# Patient Record
Sex: FEMALE | Race: WHITE | NOT HISPANIC OR LATINO
[De-identification: names, ages, dates, MRNs, and addresses within clinical notes are randomized per-mention and may not be internally consistent; named-entity substitution may affect disease eponyms.]

---

## 2021-04-29 PROBLEM — Z00.00 ENCOUNTER FOR PREVENTIVE HEALTH EXAMINATION: Status: ACTIVE | Noted: 2021-04-29

## 2021-05-05 ENCOUNTER — APPOINTMENT (OUTPATIENT)
Dept: INFECTIOUS DISEASE | Facility: CLINIC | Age: 86
End: 2021-05-05
Payer: MEDICARE

## 2021-05-05 VITALS
RESPIRATION RATE: 15 BRPM | HEART RATE: 87 BPM | SYSTOLIC BLOOD PRESSURE: 114 MMHG | WEIGHT: 117 LBS | TEMPERATURE: 97.9 F | DIASTOLIC BLOOD PRESSURE: 68 MMHG | OXYGEN SATURATION: 100 %

## 2021-05-05 DIAGNOSIS — N17.9 ACUTE KIDNEY FAILURE, UNSPECIFIED: ICD-10-CM

## 2021-05-05 DIAGNOSIS — Z86.03 PERSONAL HISTORY OF NEOPLASM OF UNCERTAIN BEHAVIOR: ICD-10-CM

## 2021-05-05 DIAGNOSIS — Z87.01 PERSONAL HISTORY OF PNEUMONIA (RECURRENT): ICD-10-CM

## 2021-05-05 DIAGNOSIS — Z87.19 PERSONAL HISTORY OF OTHER DISEASES OF THE DIGESTIVE SYSTEM: ICD-10-CM

## 2021-05-05 DIAGNOSIS — Z87.440 PERSONAL HISTORY OF URINARY (TRACT) INFECTIONS: ICD-10-CM

## 2021-05-05 DIAGNOSIS — N28.1 CYST OF KIDNEY, ACQUIRED: ICD-10-CM

## 2021-05-05 DIAGNOSIS — Z85.3 PERSONAL HISTORY OF MALIGNANT NEOPLASM OF BREAST: ICD-10-CM

## 2021-05-05 DIAGNOSIS — J44.1 CHRONIC OBSTRUCTIVE PULMONARY DISEASE WITH (ACUTE) EXACERBATION: ICD-10-CM

## 2021-05-05 DIAGNOSIS — Z86.39 PERSONAL HISTORY OF OTHER ENDOCRINE, NUTRITIONAL AND METABOLIC DISEASE: ICD-10-CM

## 2021-05-05 DIAGNOSIS — Z92.3 PERSONAL HISTORY OF IRRADIATION: ICD-10-CM

## 2021-05-05 DIAGNOSIS — Z86.79 PERSONAL HISTORY OF OTHER DISEASES OF THE CIRCULATORY SYSTEM: ICD-10-CM

## 2021-05-05 PROCEDURE — 99214 OFFICE O/P EST MOD 30 MIN: CPT

## 2021-05-05 RX ORDER — IPRATROPIUM BROMIDE AND ALBUTEROL SULFATE 2.5; .5 MG/3ML; MG/3ML
SOLUTION RESPIRATORY (INHALATION) 4 TIMES DAILY
Refills: 0 | Status: ACTIVE | COMMUNITY

## 2021-05-05 RX ORDER — FUROSEMIDE 20 MG/1
20 TABLET ORAL
Refills: 0 | Status: ACTIVE | COMMUNITY

## 2021-05-05 RX ORDER — PRAVASTATIN SODIUM 40 MG/1
40 TABLET ORAL
Refills: 0 | Status: ACTIVE | COMMUNITY

## 2021-05-05 RX ORDER — PANTOPRAZOLE SODIUM 40 MG/1
40 TABLET, DELAYED RELEASE ORAL
Refills: 0 | Status: ACTIVE | COMMUNITY

## 2021-05-05 RX ORDER — LEVOFLOXACIN 250 MG/1
250 TABLET, FILM COATED ORAL
Refills: 0 | Status: COMPLETED | COMMUNITY

## 2021-05-05 NOTE — HISTORY OF PRESENT ILLNESS
[FreeTextEntry1] : 88 y/o woman with COPD, h/o left breast cancer s/p lumpectomy and XRT to left chest, admitted with SOB and left sided pneumonia- s/p completing 10 days levaquin on 5/2 \par Continues to have SOB with exertion \par Pulse Ox shows good O2 levels - has a visiting nurse at home\par c/o mild cough but no sputum \par no fever or chills\par remains on prednisone taper at 20mg daily \par has an appointment with pulmonary on 5/17 \par c/o constipation \par Hospital blood work showed a highly elevated Mycoplasma IGM -- likely an atypical pneumonia with mycoplasma \par

## 2021-05-05 NOTE — ASSESSMENT
[Treatment Education] : treatment education [Treatment Adherence] : treatment adherence [Drug Interactions / Side Effects] : drug interactions/side effects [FreeTextEntry1] : 86 y/o woman with COPD, h/o left breast cancer s/p lumpectomy and XRT to left chest, admitted with SOB and left sided pneumonia- s/p completing 10 days levaquin on 5/2 \par Continues to have SOB with exertion \par Pulse Ox shows good O2 levels - has a visiting nurse at home\par c/o mild cough but no sputum \par no fever or chills\par remains on prednisone taper at 20mg daily \par has an appointment with pulmonary on 5/17 \par c/o constipation \par Had a low CD4 count in the hospital \par Hospital blood work showed a highly elevated Mycoplasma IGM -- likely an atypical pneumonia with mycoplasma \par \par Rec:\par \par 1. Levaquin 250mg po daily x 5 days to complete 2 weeks therapy \par 2. Repeat CXR \par 3. Taper down prednisone to 10mg daily until pulmonary follow up on 5/17 \par 4. Probiotics while on Abxs \par 5. Miralax for constipation \par 6. Repeat T cell CD4 count next week \par 7. Pulmonary follow up \par 8. Follow up next week \par \par plan above d/w patient and daughter at length, questions answered \par

## 2021-05-05 NOTE — PHYSICAL EXAM
[General Appearance - Alert] : alert [General Appearance - In No Acute Distress] : in no acute distress [General Appearance - Well Nourished] : well nourished [Sclera] : the sclera and conjunctiva were normal [Extraocular Movements] : extraocular movements were intact [Outer Ear] : the ears and nose were normal in appearance [Examination Of The Oral Cavity] : the lips and gums were normal [Neck Appearance] : the appearance of the neck was normal [Neck Cervical Mass (___cm)] : no neck mass was observed [Respiration, Rhythm And Depth] : normal respiratory rhythm and effort [Auscultation Breath Sounds / Voice Sounds] : lungs were clear to auscultation bilaterally [Exaggerated Use Of Accessory Muscles For Inspiration] : no accessory muscle use [Heart Rate And Rhythm] : heart rate was normal and rhythm regular [Heart Sounds] : normal S1 and S2 [Edema] : there was no peripheral edema [Bowel Sounds] : normal bowel sounds [Abdomen Soft] : soft [Abdomen Tenderness] : non-tender [Musculoskeletal - Swelling] : no joint swelling [Range of Motion to Joints] : range of motion to joints [] : no rash [Motor Exam] : the motor exam was normal [No Focal Deficits] : no focal deficits [FreeTextEntry1] : ++ ecchymosis UEs

## 2021-05-05 NOTE — REVIEW OF SYSTEMS
[Chills] : chills [Feeling Tired] : feeling tired [Shortness Of Breath] : shortness of breath [Cough] : cough [SOB on Exertion] : shortness of breath during exertion [Constipation] : constipation [Negative] : Heme/Lymph [Fever] : no fever [Body Aches] : no body aches [Feeling Sick] : not feeling sick [Wheezing] : no wheezing [Sputum] : not coughing up ~M sputum [Pleuritic Chest Pain] : no pleuritic chest pain [Abdominal Pain] : no abdominal pain [Vomiting] : no vomiting

## 2021-05-05 NOTE — REASON FOR VISIT
[Post Hospitalization] : a post hospitalization visit [FreeTextEntry1] : fu from hospital stay for LLL Pneumonia\par Levaquin complete \par still c/o trouble breadthing and pain near left ribcage

## 2021-05-12 ENCOUNTER — APPOINTMENT (OUTPATIENT)
Dept: INFECTIOUS DISEASE | Facility: CLINIC | Age: 86
End: 2021-05-12
Payer: MEDICARE

## 2021-05-12 VITALS
RESPIRATION RATE: 15 BRPM | TEMPERATURE: 98.3 F | OXYGEN SATURATION: 100 % | DIASTOLIC BLOOD PRESSURE: 68 MMHG | SYSTOLIC BLOOD PRESSURE: 110 MMHG | HEART RATE: 70 BPM

## 2021-05-12 DIAGNOSIS — J15.7 PNEUMONIA DUE TO MYCOPLASMA PNEUMONIAE: ICD-10-CM

## 2021-05-12 PROCEDURE — 99214 OFFICE O/P EST MOD 30 MIN: CPT

## 2021-05-12 RX ORDER — LEVOFLOXACIN 250 MG/1
250 TABLET, FILM COATED ORAL DAILY
Qty: 5 | Refills: 0 | Status: DISCONTINUED | COMMUNITY
Start: 2021-05-05 | End: 2021-05-12

## 2021-05-12 NOTE — HISTORY OF PRESENT ILLNESS
[FreeTextEntry1] : 88 y/o woman with COPD, h/o left breast cancer s/p lumpectomy and XRT to left chest, admitted with SOB and left sided pneumonia- s/p completing 10 days levaquin on 5/2 \par Continues to have SOB with exertion at times \par Pulse Ox shows good O2 levels - has a visiting nurse at home\par \par \par no fever or chills\par remains on prednisone taper at 10mg daily \par has an appointment with pulmonary on 5/17 \par \par feels generally better but fatigued at times \par no cough or sputum \par \par Repeat CXR done -- resolution of pneumonia \par Repeat CD4 count 149 \par \par patient on chronic steroids \par \par per daughter had some episodes of confusion with steroids

## 2021-05-12 NOTE — REASON FOR VISIT
[Follow-Up: _____] : a [unfilled] follow-up visit [Family Member] : family member [FreeTextEntry1] : 1 wk fu \par levaquin completed\par pt feels well, only c/o confusion, started when taking prednisone \par CXR/ bw results

## 2021-05-12 NOTE — ASSESSMENT
[Treatment Education] : treatment education [Treatment Adherence] : treatment adherence [Rx Dose / Side Effects] : Rx dose/side effects [Drug Interactions / Side Effects] : drug interactions/side effects [FreeTextEntry1] : 88 y/o woman with COPD, h/o left breast cancer s/p lumpectomy and XRT to left chest, admitted with SOB and left sided pneumonia- s/p completing 14 days levaquin \par Pulse Ox shows good O2 levels - has a visiting nurse at home\par remains on prednisone taper at 10 mg daily \par has an appointment with pulmonary on 5/17 \par Hospital blood work showed a highly elevated Mycoplasma IGM -- likely an atypical pneumonia with mycoplasma \par \par feels generally better but fatigued at times \par no cough or sputum \par Repeat CXR done -- resolution of pneumonia \par Repeat CD4 count 149 \par \par patient on chronic steroids \par per daughter had some episodes of confusion with steroids \par \par Rec:\par \par 1. Taper down prednisone to 5mg daily \par 2. Follow up with pulmonary on 5/17 \par 3. No further abxs for pneumonia at this time-- resolved \par 4. Start Bactrim DS 1 po 3 x weekly for low CD4 count -- discussed with patient risk of opportunistic infection and Bactrim Side effects \par 5. Monitor CBC with diff and CMP on Bactrim -- do blood work in 3 weeks \par 6. Follow up in 3-4 weeks \par \par plan above d/w patient and daughter at length , questions answered \par

## 2021-05-12 NOTE — PHYSICAL EXAM
[General Appearance - Alert] : alert [General Appearance - In No Acute Distress] : in no acute distress [General Appearance - Well Nourished] : well nourished [Sclera] : the sclera and conjunctiva were normal [Extraocular Movements] : extraocular movements were intact [Outer Ear] : the ears and nose were normal in appearance [Examination Of The Oral Cavity] : the lips and gums were normal [Neck Appearance] : the appearance of the neck was normal [Neck Cervical Mass (___cm)] : no neck mass was observed [Respiration, Rhythm And Depth] : normal respiratory rhythm and effort [Exaggerated Use Of Accessory Muscles For Inspiration] : no accessory muscle use [Auscultation Breath Sounds / Voice Sounds] : lungs were clear to auscultation bilaterally [Heart Rate And Rhythm] : heart rate was normal and rhythm regular [Heart Sounds] : normal S1 and S2 [Edema] : there was no peripheral edema [Bowel Sounds] : normal bowel sounds [Abdomen Soft] : soft [Abdomen Tenderness] : non-tender [Musculoskeletal - Swelling] : no joint swelling [Range of Motion to Joints] : range of motion to joints [] : no rash [Motor Exam] : the motor exam was normal [No Focal Deficits] : no focal deficits [FreeTextEntry1] : ++ ecchymosis UEs

## 2021-05-17 ENCOUNTER — APPOINTMENT (OUTPATIENT)
Dept: PULMONOLOGY | Facility: CLINIC | Age: 86
End: 2021-05-17
Payer: MEDICARE

## 2021-05-17 VITALS
TEMPERATURE: 97.6 F | OXYGEN SATURATION: 98 % | HEART RATE: 82 BPM | SYSTOLIC BLOOD PRESSURE: 110 MMHG | DIASTOLIC BLOOD PRESSURE: 64 MMHG

## 2021-05-17 DIAGNOSIS — Z87.891 PERSONAL HISTORY OF NICOTINE DEPENDENCE: ICD-10-CM

## 2021-05-17 PROCEDURE — 99214 OFFICE O/P EST MOD 30 MIN: CPT

## 2021-05-17 RX ORDER — PREDNISONE 10 MG/1
10 TABLET ORAL
Refills: 0 | Status: DISCONTINUED | COMMUNITY
End: 2021-05-17

## 2021-05-17 NOTE — DISCUSSION/SUMMARY
[FreeTextEntry1] : Pt  with long hx copd and recent admission to hospital for exac copd and pneumonia\par has completed abx and cxr resolution of pneumonia\par still with baseline dyspnea\par will cont breo 100 1 qd\par will cont combivent respimat 1 sp qid but can change to prn if feels good\par will cont prednisone 5 mg qd and dec to maintenance dose of 2.5 mg qd\par The patient and daughter understands and agrees with plan of care.\par Today's office visit encompassed 32 minutes. I conducted an extensive history ,physical exam and reviewed diagnosis and treatment options  including diagnostic tests,radiologic studies including  cat scans  and the use of prescription medication.

## 2021-05-17 NOTE — REASON FOR VISIT
[Follow-Up - From Hospitalization] : a follow-up visit after a recent hospitalization [Pneumonia] : pneumonia [TextBox_44] : ANAHI MELTON AND AFIB

## 2021-05-17 NOTE — HISTORY OF PRESENT ILLNESS
[Former] : former [Never] : never [TextBox_4] : 87 female recent [place] hospitalization for pneumonia\par today feels good \par always with dyspnea no cough  no chest pain no tighness\par no wheeze  quit tobacco 15 yrs ago\par sleep sitting up in couch\par precipitating factors +hot air\par ++ page \par  [TextBox_11] : 1 [TextBox_13] : 40 [YearQuit] : 2005

## 2021-05-19 LAB
ALBUMIN SERPL ELPH-MCNC: 4.8 G/DL
ALP BLD-CCNC: 86 U/L
ALT SERPL-CCNC: 14 U/L
ANION GAP SERPL CALC-SCNC: 20 MMOL/L
AST SERPL-CCNC: 28 U/L
BASOPHILS # BLD AUTO: 0.05 K/UL
BASOPHILS NFR BLD AUTO: 0.7 %
BILIRUB SERPL-MCNC: 0.4 MG/DL
BUN SERPL-MCNC: 20 MG/DL
CALCIUM SERPL-MCNC: 10.2 MG/DL
CHLORIDE SERPL-SCNC: 103 MMOL/L
CO2 SERPL-SCNC: 13 MMOL/L
CREAT SERPL-MCNC: 0.89 MG/DL
EOSINOPHIL # BLD AUTO: 0.16 K/UL
EOSINOPHIL NFR BLD AUTO: 2.2 %
GLUCOSE SERPL-MCNC: 91 MG/DL
HCT VFR BLD CALC: 42.3 %
HGB BLD-MCNC: 13.2 G/DL
IMM GRANULOCYTES NFR BLD AUTO: 0.7 %
LYMPHOCYTES # BLD AUTO: 2.21 K/UL
LYMPHOCYTES NFR BLD AUTO: 30.6 %
MAN DIFF?: NORMAL
MCHC RBC-ENTMCNC: 30.3 PG
MCHC RBC-ENTMCNC: 31.2 GM/DL
MCV RBC AUTO: 97 FL
MONOCYTES # BLD AUTO: 0.7 K/UL
MONOCYTES NFR BLD AUTO: 9.7 %
NEUTROPHILS # BLD AUTO: 4.05 K/UL
NEUTROPHILS NFR BLD AUTO: 56.1 %
PLATELET # BLD AUTO: 303 K/UL
POTASSIUM SERPL-SCNC: 5.7 MMOL/L
PROT SERPL-MCNC: 7.7 G/DL
RBC # BLD: 4.36 M/UL
RBC # FLD: 14.4 %
SODIUM SERPL-SCNC: 137 MMOL/L
WBC # FLD AUTO: 7.22 K/UL

## 2021-06-04 ENCOUNTER — APPOINTMENT (OUTPATIENT)
Dept: PULMONOLOGY | Facility: CLINIC | Age: 86
End: 2021-06-04
Payer: MEDICARE

## 2021-06-04 VITALS
OXYGEN SATURATION: 97 % | TEMPERATURE: 96.9 F | DIASTOLIC BLOOD PRESSURE: 72 MMHG | HEART RATE: 70 BPM | SYSTOLIC BLOOD PRESSURE: 130 MMHG

## 2021-06-04 DIAGNOSIS — J18.9 PNEUMONIA, UNSPECIFIED ORGANISM: ICD-10-CM

## 2021-06-04 DIAGNOSIS — Z23 ENCOUNTER FOR IMMUNIZATION: ICD-10-CM

## 2021-06-04 PROCEDURE — 99214 OFFICE O/P EST MOD 30 MIN: CPT

## 2021-06-04 NOTE — HISTORY OF PRESENT ILLNESS
[Former] : former [>= 30 pack years] : >= 30 pack years [Never] : never [TextBox_4] : 87 female recent hospital for cva\par some page  heaviness in chest \par no wheeze  +cough and phlegm\par +orthopnea  sleep sitting up with several pillows\par using breo 1 qd and combivent prn and albuterol prn via neb [TextBox_11] : 1 [YearQuit] : 2009

## 2021-06-04 NOTE — REASON FOR VISIT
[Follow-Up] : a follow-up visit [Pneumonia] : pneumonia [COPD] : COPD [Shortness of Breath] : shortness of breath [Family Member] : family member [TextBox_44] : shortness of breath upon exertion

## 2021-06-04 NOTE — DISCUSSION/SUMMARY
[FreeTextEntry1] : sp cva in hospital \par copd stable but pt always co some dyspnea\par lungs clear and no edema\par will cont all medication \par will hold prednisone today but if pt notes increasing dyspnea will start prednisone 5 mg qd as maintenance dose\par The patient understands and agrees with plan of care.\par Today's office visit encompassed 32 minutes. I conducted an extensive history ,physical exam and reviewed diagnosis and treatment options  including diagnostic tests,radiologic studies including  cat scans  and the use of prescription medication.

## 2021-06-16 ENCOUNTER — APPOINTMENT (OUTPATIENT)
Dept: INFECTIOUS DISEASE | Facility: CLINIC | Age: 86
End: 2021-06-16
Payer: MEDICARE

## 2021-06-16 ENCOUNTER — NON-APPOINTMENT (OUTPATIENT)
Age: 86
End: 2021-06-16

## 2021-06-16 ENCOUNTER — LABORATORY RESULT (OUTPATIENT)
Age: 86
End: 2021-06-16

## 2021-06-16 VITALS
TEMPERATURE: 98.8 F | RESPIRATION RATE: 15 BRPM | HEART RATE: 62 BPM | DIASTOLIC BLOOD PRESSURE: 64 MMHG | SYSTOLIC BLOOD PRESSURE: 112 MMHG

## 2021-06-16 DIAGNOSIS — D72.810 LYMPHOCYTOPENIA: ICD-10-CM
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
## 2021-06-16 PROCEDURE — 99214 OFFICE O/P EST MOD 30 MIN: CPT | Mod: 25

## 2021-06-16 PROCEDURE — 36415 COLL VENOUS BLD VENIPUNCTURE: CPT

## 2021-06-16 RX ORDER — SULFAMETHOXAZOLE AND TRIMETHOPRIM 800; 160 MG/1; MG/1
800-160 TABLET ORAL DAILY
Qty: 30 | Refills: 0 | Status: DISCONTINUED | COMMUNITY
Start: 2021-05-12 | End: 2021-06-16

## 2021-06-16 RX ORDER — PREDNISONE 5 MG/1
5 TABLET ORAL TWICE DAILY
Refills: 0 | Status: DISCONTINUED | COMMUNITY
End: 2021-06-16

## 2021-06-16 NOTE — HISTORY OF PRESENT ILLNESS
[FreeTextEntry1] : Patient is here for follow up after her most recent hospitalization with CVA\par \par feels well \par follows with neurology \par \par c/o LE edema - will follow up with cardiologist regarding diuretics \par no fever or chills\par \par no SOB -- off prednisone - saw pulmonary in follow up \par \par daughter kym she removed a tick from her mother 2 days ago - not engorged, felt itchy at site\par she brought tick with her in a bag -- examined, noted to be a lone star tick \par \par \par

## 2021-06-16 NOTE — ASSESSMENT
[FreeTextEntry1] : 88 y/o woman with COPD, h/o left breast cancer s/p lumpectomy and XRT to left chest, s/p treatment for pneumonia \par Had a low CD4 count and was started on Bactrim briefly then was discontinued- \par Had confusion, went to the ER, found to have a new CVA \par Has been off of prednisone and follows with pulmonary \par c/o LE edema - will follow up with cardiologist regarding diuretics \par daughter said she removed a tick from her mother 2 days ago - not engorged, felt itchy at site - bite site seen, no ECM rash \par she brought tick with her in a bag -- examined, noted to be a lone star tick \par Patient with multiple extensive allergies \par \par Rec:\par \par 1. No treatment required for the tick bite\par 2. Watch area for any rash / ring lesion \par 3. Check CD4 count \par 4. No abx necessary at this time \par 5. Follow up with cardiology regarding LE edema / diuretics \par 6. Follow up with neurology - post CVA \par \par Plan above d/w patient and daughter at length , questions answered \par  [Treatment Education] : treatment education

## 2021-06-16 NOTE — PHYSICAL EXAM
[General Appearance - Alert] : alert [General Appearance - In No Acute Distress] : in no acute distress [General Appearance - Well Nourished] : well nourished [Sclera] : the sclera and conjunctiva were normal [Extraocular Movements] : extraocular movements were intact [Outer Ear] : the ears and nose were normal in appearance [Examination Of The Oral Cavity] : the lips and gums were normal [Neck Appearance] : the appearance of the neck was normal [Neck Cervical Mass (___cm)] : no neck mass was observed [Respiration, Rhythm And Depth] : normal respiratory rhythm and effort [Exaggerated Use Of Accessory Muscles For Inspiration] : no accessory muscle use [Auscultation Breath Sounds / Voice Sounds] : lungs were clear to auscultation bilaterally [Heart Rate And Rhythm] : heart rate was normal and rhythm regular [Heart Sounds] : normal S1 and S2 [FreeTextEntry1] : BL LE edema, no erythema  [Bowel Sounds] : normal bowel sounds [Abdomen Soft] : soft [Abdomen Tenderness] : non-tender [Musculoskeletal - Swelling] : no joint swelling [Range of Motion to Joints] : range of motion to joints [Skin Color & Pigmentation] : normal skin color and pigmentation [] : no rash [Motor Exam] : the motor exam was normal [No Focal Deficits] : no focal deficits [Oriented To Time, Place, And Person] : oriented to person, place, and time [Affect] : the affect was normal

## 2021-06-16 NOTE — REASON FOR VISIT
[Follow-Up: _____] : a [unfilled] follow-up visit [Family Member] : family member [FreeTextEntry1] : 4 wk fu \par pt had tick bite Saturday (c/o itchiness at site; no c/o rash)\par Pt saw PCP () yesterday (c/o water retention- PCP did not address); pt not currently on antibiotics

## 2021-06-23 LAB
BASOPHILS # BLD AUTO: 0.05 K/UL
BASOPHILS NFR BLD AUTO: 0.6 %
CD3 CELLS # BLD: 1097 /UL
CD3 CELLS NFR BLD: 83 %
CD3+CD4+ CELLS # BLD: 383 /UL
CD3+CD4+ CELLS NFR BLD: 29 %
CD3+CD4+ CELLS/CD3+CD8+ CLL SPEC: 0.6 RATIO
CD3+CD8+ CELLS # SPEC: 635 /UL
CD3+CD8+ CELLS NFR BLD: 48 %
EOSINOPHIL # BLD AUTO: 0.04 K/UL
EOSINOPHIL NFR BLD AUTO: 0.5 %
HCT VFR BLD CALC: 37.5 %
HGB BLD-MCNC: 10.9 G/DL
IMM GRANULOCYTES NFR BLD AUTO: 0.5 %
LYMPHOCYTES # BLD AUTO: 1.37 K/UL
LYMPHOCYTES NFR BLD AUTO: 17 %
MAN DIFF?: NORMAL
MCHC RBC-ENTMCNC: 29.1 GM/DL
MCHC RBC-ENTMCNC: 31.7 PG
MCV RBC AUTO: 109 FL
MONOCYTES # BLD AUTO: 0.75 K/UL
MONOCYTES NFR BLD AUTO: 9.3 %
NEUTROPHILS # BLD AUTO: 5.8 K/UL
NEUTROPHILS NFR BLD AUTO: 72.1 %
PLATELET # BLD AUTO: 199 K/UL
RBC # BLD: 3.44 M/UL
RBC # FLD: 14.5 %
WBC # FLD AUTO: 8.05 K/UL

## 2021-08-19 ENCOUNTER — APPOINTMENT (OUTPATIENT)
Dept: PULMONOLOGY | Facility: CLINIC | Age: 86
End: 2021-08-19

## 2021-08-20 ENCOUNTER — APPOINTMENT (OUTPATIENT)
Dept: PULMONOLOGY | Facility: CLINIC | Age: 86
End: 2021-08-20
Payer: MEDICARE

## 2021-08-20 VITALS
DIASTOLIC BLOOD PRESSURE: 68 MMHG | TEMPERATURE: 97.1 F | HEART RATE: 74 BPM | SYSTOLIC BLOOD PRESSURE: 108 MMHG | OXYGEN SATURATION: 99 %

## 2021-08-20 DIAGNOSIS — R60.0 LOCALIZED EDEMA: ICD-10-CM

## 2021-08-20 DIAGNOSIS — J18.9 PNEUMONIA, UNSPECIFIED ORGANISM: ICD-10-CM

## 2021-08-20 PROCEDURE — 99214 OFFICE O/P EST MOD 30 MIN: CPT

## 2021-08-20 RX ORDER — GUAIFENESIN AND PSEUDOEPHEDRINE HYDROCHLORIDE 600; 60 MG/1; MG/1
60-600 TABLET, EXTENDED RELEASE ORAL
Refills: 0 | Status: ACTIVE | COMMUNITY

## 2021-08-20 RX ORDER — ATORVASTATIN CALCIUM 40 MG/1
40 TABLET, FILM COATED ORAL
Refills: 0 | Status: ACTIVE | COMMUNITY

## 2021-08-20 RX ORDER — APIXABAN 2.5 MG/1
2.5 TABLET, FILM COATED ORAL
Refills: 0 | Status: ACTIVE | COMMUNITY

## 2021-08-20 RX ORDER — IPRATROPIUM BROMIDE AND ALBUTEROL 20; 100 UG/1; UG/1
20-100 SPRAY, METERED RESPIRATORY (INHALATION) 4 TIMES DAILY
Qty: 1 | Refills: 6 | Status: ACTIVE | COMMUNITY
Start: 2021-05-17 | End: 1900-01-01

## 2021-08-20 RX ORDER — FLUTICASONE FUROATE AND VILANTEROL TRIFENATATE 200; 25 UG/1; UG/1
200-25 POWDER RESPIRATORY (INHALATION)
Refills: 0 | Status: DISCONTINUED | COMMUNITY
End: 2021-08-20

## 2021-08-20 RX ORDER — MELATONIN 5 MG
5 CAPSULE ORAL
Refills: 0 | Status: ACTIVE | COMMUNITY

## 2021-08-20 NOTE — DISCUSSION/SUMMARY
[FreeTextEntry1] : Ms. Lazaro has COPD and at this time seems to be doing well.  There was a consideration for maintenance steroids last visit but does not seem necessary.  We will continue the Symbicort 2 sprays twice a day and use in the lev albuterol via nebulizer or Combivent HFA on an as-needed basis.  She was recently diagnosed with aspiration pneumonia and has been instructed on the proper way to eat her food.  I talked to her extensively that she must follow his instructions because of the aspiration continues she will get multiple areas of pneumonia could possibly develop fibrosis and end up with a percutaneous feeding tube.  The patient has some very nondescript pain in the right lower lateral thorax.  She it started last night but is overall improving.  Her on exam her lungs are clear.  Although she has slight edema there is no redness or tenderness in the legs.  I have told the family that at this time I would just continue observation if the pain does not go away we will read perform a chest x-ray.  She will continue the diuretics as prescribed per Dr. Delacruz .\par The patient understands and agrees with plan of care.\par Today's office visit encompassed 32 minutes. I conducted an extensive history ,physical exam and reviewed diagnosis and treatment options  including diagnostic tests,radiologic studies including  cat scans  and the use of prescription medication.

## 2021-08-20 NOTE — HISTORY OF PRESENT ILLNESS
[Former] : former [>= 30 pack years] : >= 30 pack years [Never] : never [TextBox_4] : 87 female recent hospital [place] for aspiration pneumonia \par treated iv abx and hospitalized for 13 days  and then rehab  TCU for 5 days\par dced off abx had completed in hospital\par today feels fair co pain lower right thorax same area of pneumonia  no fever rare phlegm\par swallow study must eat small bites \par full exercise ability\par no nite awakening\par  [TextBox_11] : 1 [YearQuit] : 2009

## 2021-08-20 NOTE — REASON FOR VISIT
[Follow-Up] : a follow-up visit [Pneumonia] : pneumonia [Cough] : cough [COPD] : COPD [Shortness of Breath] : shortness of breath [Family Member] : family member [TextBox_44] : Went to [place] ER 7/29 for SOB, vomitting and fever. Pt was admitted for 13 days. Was treated for pnuemonia with Symbicort, Nebulizers and Steroids.

## 2021-08-20 NOTE — PHYSICAL EXAM
[No Acute Distress] : no acute distress [Normal Oropharynx] : normal oropharynx [Normal Appearance] : normal appearance [No Neck Mass] : no neck mass [Normal Rate/Rhythm] : normal rate/rhythm [Normal S1, S2] : normal s1, s2 [No Murmurs] : no murmurs [No Resp Distress] : no resp distress [Clear to Auscultation Bilaterally] : clear to auscultation bilaterally [No Abnormalities] : no abnormalities [Benign] : benign [Normal Gait] : normal gait [No Clubbing] : no clubbing [No Cyanosis] : no cyanosis [No Edema] : no edema [FROM] : FROM [1+ Pitting] : 1+ pitting [Normal Color/ Pigmentation] : normal color/ pigmentation [No Focal Deficits] : no focal deficits [Oriented x3] : oriented x3 [Normal Affect] : normal affect

## 2021-09-10 ENCOUNTER — EMERGENCY (EMERGENCY)
Facility: HOSPITAL | Age: 86
LOS: 1 days | End: 2021-09-10
Admitting: EMERGENCY MEDICINE
Payer: MEDICARE

## 2021-09-10 PROCEDURE — 99283 EMERGENCY DEPT VISIT LOW MDM: CPT

## 2021-09-10 PROCEDURE — 71045 X-RAY EXAM CHEST 1 VIEW: CPT | Mod: 26

## 2021-10-07 ENCOUNTER — APPOINTMENT (OUTPATIENT)
Dept: PULMONOLOGY | Facility: CLINIC | Age: 86
End: 2021-10-07
Payer: MEDICARE

## 2021-10-07 VITALS
HEIGHT: 65 IN | SYSTOLIC BLOOD PRESSURE: 105 MMHG | OXYGEN SATURATION: 96 % | HEART RATE: 77 BPM | TEMPERATURE: 97.3 F | BODY MASS INDEX: 17.99 KG/M2 | WEIGHT: 108 LBS | DIASTOLIC BLOOD PRESSURE: 60 MMHG

## 2021-10-07 PROCEDURE — 99214 OFFICE O/P EST MOD 30 MIN: CPT

## 2021-10-07 RX ORDER — APIXABAN 2.5 MG/1
2.5 TABLET, FILM COATED ORAL
Refills: 0 | Status: DISCONTINUED | COMMUNITY
End: 2021-10-07

## 2021-10-07 NOTE — DISCUSSION/SUMMARY
[FreeTextEntry1] : Ms. Lazaro has COPD.  She finds that she has dyspnea on exertion but no overt wheezing or congestion.  She is currently on Symbicort 2 sprays twice a day.  We will add Spiriva 2.5 mg 2 sprays every morning and see if it improves her symptoms.  The patient complains of postherpetic neuralgia.  She is to take pain medicines and I have told her this could last for an extended period of time.\par The patient understands and agrees with plan of care.\par Today's office visit encompassed 32 minutes. I conducted an extensive history ,physical exam and reviewed diagnosis and treatment options  including diagnostic tests,radiologic studies including  cat scans  and the use of prescription medication.

## 2021-10-07 NOTE — REASON FOR VISIT
[Follow-Up] : a follow-up visit [Pneumonia] : pneumonia [Cough] : cough [COPD] : COPD [Shortness of Breath] : shortness of breath [TextBox_44] : 6 weeks. Pt presents SOB with any activity. Pt states that she hasn’t been able to go out much because she is unable to move around without feeling SOB, and very occasionally she coughs up phlegm. Pt doesn’t notice much of a difference with symbicort and thinks it makes her shaky.

## 2021-10-07 NOTE — HISTORY OF PRESENT ILLNESS
[Former] : former [>= 30 pack years] : >= 30 pack years [Never] : never [TextBox_4] : 87 female hx copd and sob with exertion\par symbiort 2 sp bid and still sob  no wheeze no cough no phlegm\par recent zoster \par no chest pain  [TextBox_11] : 1 [YearQuit] : 2009

## 2021-11-30 ENCOUNTER — APPOINTMENT (OUTPATIENT)
Dept: PULMONOLOGY | Facility: CLINIC | Age: 86
End: 2021-11-30

## 2021-12-20 ENCOUNTER — APPOINTMENT (OUTPATIENT)
Dept: PULMONOLOGY | Facility: CLINIC | Age: 86
End: 2021-12-20

## 2022-04-13 ENCOUNTER — OUTPATIENT (OUTPATIENT)
Dept: OUTPATIENT SERVICES | Facility: HOSPITAL | Age: 87
LOS: 1 days | End: 2022-04-13

## 2022-04-13 DIAGNOSIS — Z20.828 CONTACT WITH AND (SUSPECTED) EXPOSURE TO OTHER VIRAL COMMUNICABLE DISEASES: ICD-10-CM

## 2022-05-25 ENCOUNTER — APPOINTMENT (OUTPATIENT)
Dept: GASTROENTEROLOGY | Facility: CLINIC | Age: 87
End: 2022-05-25
Payer: MEDICARE

## 2022-05-25 RX ORDER — LEVALBUTEROL HYDROCHLORIDE 0.63 MG/3ML
0.63 SOLUTION RESPIRATORY (INHALATION)
Refills: 0 | Status: DISCONTINUED | COMMUNITY
End: 2022-05-25

## 2022-05-27 ENCOUNTER — APPOINTMENT (OUTPATIENT)
Dept: GASTROENTEROLOGY | Facility: CLINIC | Age: 87
End: 2022-05-27
Payer: MEDICARE

## 2022-05-27 VITALS
WEIGHT: 100 LBS | OXYGEN SATURATION: 95 % | RESPIRATION RATE: 16 BRPM | TEMPERATURE: 97.8 F | BODY MASS INDEX: 17.5 KG/M2 | HEIGHT: 63.2 IN | DIASTOLIC BLOOD PRESSURE: 74 MMHG | SYSTOLIC BLOOD PRESSURE: 118 MMHG | HEART RATE: 71 BPM

## 2022-05-27 DIAGNOSIS — R10.9 UNSPECIFIED ABDOMINAL PAIN: ICD-10-CM

## 2022-05-27 DIAGNOSIS — Z01.818 ENCOUNTER FOR OTHER PREPROCEDURAL EXAMINATION: ICD-10-CM

## 2022-05-27 DIAGNOSIS — I48.91 UNSPECIFIED ATRIAL FIBRILLATION: ICD-10-CM

## 2022-05-27 DIAGNOSIS — R74.8 ABNORMAL LEVELS OF OTHER SERUM ENZYMES: ICD-10-CM

## 2022-05-27 PROCEDURE — 99203 OFFICE O/P NEW LOW 30 MIN: CPT

## 2022-05-28 NOTE — PHYSICAL EXAM
[General Appearance - Alert] : alert [General Appearance - In No Acute Distress] : in no acute distress [Sclera] : the sclera and conjunctiva were normal [PERRL With Normal Accommodation] : pupils were equal in size, round, and reactive to light [Extraocular Movements] : extraocular movements were intact [Outer Ear] : the ears and nose were normal in appearance [Oropharynx] : the oropharynx was normal [Neck Appearance] : the appearance of the neck was normal [Neck Cervical Mass (___cm)] : no neck mass was observed [Jugular Venous Distention Increased] : there was no jugular-venous distention [Thyroid Diffuse Enlargement] : the thyroid was not enlarged [Thyroid Nodule] : there were no palpable thyroid nodules [Auscultation Breath Sounds / Voice Sounds] : lungs were clear to auscultation bilaterally [Heart Rate And Rhythm] : heart rate was normal and rhythm regular [Heart Sounds] : normal S1 and S2 [Heart Sounds Gallop] : no gallops [Murmurs] : no murmurs [Heart Sounds Pericardial Friction Rub] : no pericardial rub [Bowel Sounds] : normal bowel sounds [Abdomen Soft] : soft [Abdomen Tenderness] : non-tender [Abdomen Mass (___ Cm)] : no abdominal mass palpated [Cervical Lymph Nodes Enlarged Posterior Bilaterally] : posterior cervical [Cervical Lymph Nodes Enlarged Anterior Bilaterally] : anterior cervical [Supraclavicular Lymph Nodes Enlarged Bilaterally] : supraclavicular [Axillary Lymph Nodes Enlarged Bilaterally] : axillary [Femoral Lymph Nodes Enlarged Bilaterally] : femoral [Inguinal Lymph Nodes Enlarged Bilaterally] : inguinal [No CVA Tenderness] : no ~M costovertebral angle tenderness [No Spinal Tenderness] : no spinal tenderness [Abnormal Walk] : normal gait [Nail Clubbing] : no clubbing  or cyanosis of the fingernails [Musculoskeletal - Swelling] : no joint swelling seen [Motor Tone] : muscle strength and tone were normal [Skin Color & Pigmentation] : normal skin color and pigmentation [Skin Turgor] : normal skin turgor [] : no rash [No Focal Deficits] : no focal deficits [Oriented To Time, Place, And Person] : oriented to person, place, and time [Impaired Insight] : insight and judgment were intact [Affect] : the affect was normal

## 2022-05-28 NOTE — HISTORY OF PRESENT ILLNESS
[de-identified] : The patient arrived for a consultation.  She was referred by Dr. Dano Regan.  Patient has been complaining of weight loss since the beginning of this year.  She has been diagnosed with walking pneumonia and now feels better.  She has been now complaining of heaviness in the top of her abdominal and also in the chest area after eating.  Her recent LFTs were elevated with especially alkaline phosphatase elevation.  She has undergone a PET scan and also MRI.  MRI has revealed gastric distention.  No clear pancreaticobiliary pathology has been noted.  PET scan has been unimpressive.  She was recommended to undergo EGD and EUS.  He has already obtained a cardiology clearance.

## 2022-05-28 NOTE — ASSESSMENT
[FreeTextEntry1] : The patient most likely has underlying gastroparesis.  The patient will be scheduled for EGD/EUS.  She is already cleared by cardiology.  We will also evaluate for any pancreatic or biliary pathology.If she needs any ERCP we will determine based on the EUS finding.  Discussed the procedure at length.  Patient is agreeable for the procedure.\par \par Rocco Garcia MD\par Gastroenterology \par \par

## 2022-06-23 ENCOUNTER — RESULT REVIEW (OUTPATIENT)
Age: 87
End: 2022-06-23

## 2022-06-23 ENCOUNTER — TRANSCRIPTION ENCOUNTER (OUTPATIENT)
Age: 87
End: 2022-06-23

## 2022-06-23 ENCOUNTER — APPOINTMENT (OUTPATIENT)
Dept: GASTROENTEROLOGY | Facility: HOSPITAL | Age: 87
End: 2022-06-23

## 2022-06-23 ENCOUNTER — OUTPATIENT (OUTPATIENT)
Dept: OUTPATIENT SERVICES | Facility: HOSPITAL | Age: 87
LOS: 1 days | End: 2022-06-23
Payer: MEDICARE

## 2022-06-23 DIAGNOSIS — R10.9 UNSPECIFIED ABDOMINAL PAIN: ICD-10-CM

## 2022-06-23 DIAGNOSIS — R74.8 ABNORMAL LEVELS OF OTHER SERUM ENZYMES: ICD-10-CM

## 2022-06-23 PROCEDURE — 88305 TISSUE EXAM BY PATHOLOGIST: CPT | Mod: 26

## 2022-06-23 PROCEDURE — 88342 IMHCHEM/IMCYTCHM 1ST ANTB: CPT | Mod: 26

## 2022-06-23 PROCEDURE — 43259 EGD US EXAM DUODENUM/JEJUNUM: CPT

## 2022-06-23 PROCEDURE — 88342 IMHCHEM/IMCYTCHM 1ST ANTB: CPT

## 2022-06-23 PROCEDURE — 88305 TISSUE EXAM BY PATHOLOGIST: CPT

## 2022-06-23 PROCEDURE — 43237 ENDOSCOPIC US EXAM ESOPH: CPT

## 2022-06-23 PROCEDURE — 43239 EGD BIOPSY SINGLE/MULTIPLE: CPT

## 2022-06-27 ENCOUNTER — RX RENEWAL (OUTPATIENT)
Age: 87
End: 2022-06-27

## 2022-06-28 LAB — SURGICAL PATHOLOGY STUDY: SIGNIFICANT CHANGE UP

## 2023-03-03 ENCOUNTER — RX RENEWAL (OUTPATIENT)
Age: 88
End: 2023-03-03

## 2023-03-21 ENCOUNTER — APPOINTMENT (OUTPATIENT)
Dept: PULMONOLOGY | Facility: CLINIC | Age: 88
End: 2023-03-21
Payer: MEDICARE

## 2023-03-21 ENCOUNTER — NON-APPOINTMENT (OUTPATIENT)
Age: 88
End: 2023-03-21

## 2023-03-21 VITALS
OXYGEN SATURATION: 97 % | BODY MASS INDEX: 18.2 KG/M2 | DIASTOLIC BLOOD PRESSURE: 70 MMHG | HEIGHT: 63.2 IN | SYSTOLIC BLOOD PRESSURE: 140 MMHG | TEMPERATURE: 97.6 F | HEART RATE: 68 BPM | WEIGHT: 104 LBS

## 2023-03-21 PROCEDURE — 99214 OFFICE O/P EST MOD 30 MIN: CPT | Mod: 25

## 2023-03-21 PROCEDURE — 94010 BREATHING CAPACITY TEST: CPT

## 2023-03-21 RX ORDER — CALCITRIOL 0.25 UG/1
0.25 CAPSULE, LIQUID FILLED ORAL
Qty: 36 | Refills: 0 | Status: DISCONTINUED | COMMUNITY
Start: 2021-12-06 | End: 2023-03-21

## 2023-03-21 RX ORDER — BUDESONIDE AND FORMOTEROL FUMARATE DIHYDRATE 80; 4.5 UG/1; UG/1
80-4.5 AEROSOL RESPIRATORY (INHALATION)
Qty: 3 | Refills: 3 | Status: ACTIVE | COMMUNITY
Start: 2023-03-03 | End: 1900-01-01

## 2023-03-21 NOTE — HISTORY OF PRESENT ILLNESS
[Former] : former [Never] : never [TextBox_4] : 89 female hx copd last seen in office Oct 2021\par Presents today  for fu visit\par today feels good no chest pain no tightness\par occ dyspnea with activity no wheeze  no cough no phlegm occasionally white sputum\par no nite awakening [TextBox_11] : 1 [TextBox_13] : >=30 [YearQuit] : 2009

## 2023-03-21 NOTE — REASON FOR VISIT
[Pneumonia] : pneumonia [COPD] : COPD [Shortness of Breath] : shortness of breath [TextBox_44] : 17 months. Pt states she has SOB if she does anything strenuous. Pt needs refills on her medications.

## 2023-03-21 NOTE — DISCUSSION/SUMMARY
[FreeTextEntry1] : Ms. Lazaro has mild COPD.  She was slightly confused about how to use her medicine and I thoroughly and repetitively discussed she should use Symbicort 2 sprays twice a day and Spiriva 2 twists every morning.  The Combivent is a rescue inhaler but she likely will not need it if she uses the maintenance therapy regularly.  She understands and agrees.\par The patient understands and agrees with plan of care.\par Today's office visit encompassed 32 minutes. I conducted an extensive history ,physical exam and reviewed diagnosis and treatment options  including diagnostic tests,radiologic studies including  cat scans  and the use of prescription medication.

## 2023-07-21 ENCOUNTER — APPOINTMENT (OUTPATIENT)
Dept: PULMONOLOGY | Facility: CLINIC | Age: 88
End: 2023-07-21
Payer: MEDICARE

## 2023-07-21 VITALS
DIASTOLIC BLOOD PRESSURE: 60 MMHG | WEIGHT: 101 LBS | BODY MASS INDEX: 17.67 KG/M2 | HEART RATE: 58 BPM | TEMPERATURE: 97.3 F | SYSTOLIC BLOOD PRESSURE: 130 MMHG | HEIGHT: 63.2 IN | OXYGEN SATURATION: 98 %

## 2023-07-21 DIAGNOSIS — R63.4 ABNORMAL WEIGHT LOSS: ICD-10-CM

## 2023-07-21 PROCEDURE — 99214 OFFICE O/P EST MOD 30 MIN: CPT

## 2023-07-21 RX ORDER — SERTRALINE 25 MG/1
25 TABLET, FILM COATED ORAL
Refills: 0 | Status: DISCONTINUED | COMMUNITY
End: 2023-07-21

## 2023-07-21 RX ORDER — FAMOTIDINE 20 MG/1
20 TABLET, FILM COATED ORAL
Qty: 180 | Refills: 0 | Status: DISCONTINUED | COMMUNITY
Start: 2021-12-23 | End: 2023-07-21

## 2023-07-21 RX ORDER — METOPROLOL TARTRATE 50 MG/1
50 TABLET, FILM COATED ORAL
Refills: 0 | Status: DISCONTINUED | COMMUNITY
End: 2023-07-21

## 2023-07-21 NOTE — DISCUSSION/SUMMARY
[FreeTextEntry1] : Ms Lazaro has COPD.  She is well controlled on the current therapy.  She has no albuterol needs.  We will continue all of her current medicines.  The patient complains of inability gain weight and has persistent diarrhea loose stools.  I discussed with her drinking Ensure but she says this will give her diarrhea as well.  She will follow-up with her gastroenterologist Dr. Regan.  I have asked the patient to obtain a chest x-ray because of the weight loss to see if there is any overt pathology.  She understands and agrees with this plan of care.\par The patient understands and agrees with plan of care.\par Today's office visit encompassed 32 minutes. I conducted an extensive history ,physical exam and reviewed diagnosis and treatment options  including diagnostic tests,radiologic studies including  cat scans  and the use of prescription medication.

## 2023-07-21 NOTE — HISTORY OF PRESENT ILLNESS
[Former] : former [Never] : never [TextBox_4] : 89 female hx copd  and pneumonia\par today feels great \par no shortness of breath no .co no wheeze no chest pain no tightness\par full exercise ability and doing wt  to improve muscle \par no nite awakening  using ac  at nite \par no albuterol needs [TextBox_11] : 1 [TextBox_13] : >=30 [YearQuit] : 2009

## 2023-07-21 NOTE — PHYSICAL EXAM
[No Acute Distress] : no acute distress [Normal Oropharynx] : normal oropharynx [Normal Appearance] : normal appearance [No Neck Mass] : no neck mass [Normal Rate/Rhythm] : normal rate/rhythm [Normal S1, S2] : normal s1, s2 [No Murmurs] : no murmurs [No Resp Distress] : no resp distress [Clear to Auscultation Bilaterally] : clear to auscultation bilaterally [No Abnormalities] : no abnormalities [Benign] : benign [Normal Gait] : normal gait [No Clubbing] : no clubbing [No Cyanosis] : no cyanosis [No Edema] : no edema [FROM] : FROM [Normal Color/ Pigmentation] : normal color/ pigmentation [No Focal Deficits] : no focal deficits [Oriented x3] : oriented x3 [Normal Affect] : normal affect [TextBox_2] : Thin female no distress

## 2023-07-25 ENCOUNTER — NON-APPOINTMENT (OUTPATIENT)
Age: 88
End: 2023-07-25

## 2023-08-23 ENCOUNTER — NON-APPOINTMENT (OUTPATIENT)
Age: 88
End: 2023-08-23

## 2023-08-23 ENCOUNTER — APPOINTMENT (OUTPATIENT)
Dept: OPHTHALMOLOGY | Facility: CLINIC | Age: 88
End: 2023-08-23
Payer: MEDICARE

## 2023-08-23 PROCEDURE — 92134 CPTRZ OPH DX IMG PST SGM RTA: CPT

## 2023-08-23 PROCEDURE — 92004 COMPRE OPH EXAM NEW PT 1/>: CPT

## 2023-08-28 ENCOUNTER — APPOINTMENT (OUTPATIENT)
Dept: OPHTHALMOLOGY | Facility: CLINIC | Age: 88
End: 2023-08-28
Payer: MEDICARE

## 2023-08-28 ENCOUNTER — NON-APPOINTMENT (OUTPATIENT)
Age: 88
End: 2023-08-28

## 2023-08-28 PROCEDURE — 92083 EXTENDED VISUAL FIELD XM: CPT

## 2023-08-28 PROCEDURE — 92133 CPTRZD OPH DX IMG PST SGM ON: CPT

## 2023-09-06 ENCOUNTER — NON-APPOINTMENT (OUTPATIENT)
Age: 88
End: 2023-09-06

## 2023-09-06 ENCOUNTER — APPOINTMENT (OUTPATIENT)
Dept: OPHTHALMOLOGY | Facility: CLINIC | Age: 88
End: 2023-09-06
Payer: MEDICARE

## 2023-09-06 PROCEDURE — 99213 OFFICE O/P EST LOW 20 MIN: CPT

## 2023-10-01 PROBLEM — Z92.3 HISTORY OF RADIATION THERAPY: Status: RESOLVED | Noted: 2021-05-03 | Resolved: 2023-10-01

## 2023-11-01 ENCOUNTER — APPOINTMENT (OUTPATIENT)
Dept: OPHTHALMOLOGY | Facility: CLINIC | Age: 88
End: 2023-11-01

## 2023-11-14 ENCOUNTER — APPOINTMENT (OUTPATIENT)
Dept: OPHTHALMOLOGY | Facility: HOSPITAL | Age: 88
End: 2023-11-14

## 2023-11-15 ENCOUNTER — APPOINTMENT (OUTPATIENT)
Dept: OPHTHALMOLOGY | Facility: CLINIC | Age: 88
End: 2023-11-15

## 2023-11-20 ENCOUNTER — APPOINTMENT (OUTPATIENT)
Dept: OPHTHALMOLOGY | Facility: CLINIC | Age: 88
End: 2023-11-20

## 2023-11-29 ENCOUNTER — APPOINTMENT (OUTPATIENT)
Dept: OPHTHALMOLOGY | Facility: CLINIC | Age: 88
End: 2023-11-29
Payer: MEDICARE

## 2023-11-29 ENCOUNTER — NON-APPOINTMENT (OUTPATIENT)
Age: 88
End: 2023-11-29

## 2023-11-29 PROCEDURE — 99214 OFFICE O/P EST MOD 30 MIN: CPT

## 2023-11-29 PROCEDURE — 76514 ECHO EXAM OF EYE THICKNESS: CPT

## 2023-11-29 PROCEDURE — 92136 OPHTHALMIC BIOMETRY: CPT

## 2023-11-30 ENCOUNTER — APPOINTMENT (OUTPATIENT)
Dept: PULMONOLOGY | Facility: CLINIC | Age: 88
End: 2023-11-30
Payer: MEDICARE

## 2023-11-30 VITALS
BODY MASS INDEX: 17.89 KG/M2 | SYSTOLIC BLOOD PRESSURE: 132 MMHG | TEMPERATURE: 97 F | HEIGHT: 63 IN | WEIGHT: 101 LBS | DIASTOLIC BLOOD PRESSURE: 78 MMHG

## 2023-11-30 PROCEDURE — 99214 OFFICE O/P EST MOD 30 MIN: CPT

## 2023-12-11 ENCOUNTER — APPOINTMENT (OUTPATIENT)
Dept: OPHTHALMOLOGY | Facility: CLINIC | Age: 88
End: 2023-12-11

## 2023-12-18 NOTE — DISCUSSION/SUMMARY
[FreeTextEntry1] : Ms Lazaro has COPD.  She is well-controlled on the current therapy.  We will continue all medications at this time.  Will perform pulmonary function test on her next visit. The patient understands and agrees with plan of care. Today's office visit encompassed 32 minutes. I conducted an extensive history, physical exam and reviewed diagnosis and treatment options including diagnostic tests,radiology studies including cat scans and the use of prescription medication.

## 2023-12-18 NOTE — ADDENDUM
[FreeTextEntry1] : At 12/18/2023 Gabby Lazaro is a patient of Dr. Romo who saw the patient on November 30, 2023.  That encounter Dr. Olsen cough found the patient to be stable with no decompensation in her COPD.  The patient has chronic shortness of breath on exertion.  Patient apparently has no difficulty with her ADLs.   found no abnormalities on her chest exam.  Cataract surgery is a low risk procedure for pulmonary complications.  Based on a review of the chart specially the last visit on November 30, 2023 there appears to be no pulmonary contraindication for the proposed cataract surgery under conscious sedation or general anesthesia.

## 2023-12-18 NOTE — HISTORY OF PRESENT ILLNESS
[TextBox_4] : 89 female hx copd for cataract surgery Dr Sevilla today feels good  no sob no cough no wheeze no chest pain no tightness full activity no nite activity occ pain left shoulder  and  ant left chest  seen by cardiology no dx for wt loss  no rescue inhaler needs [TextBox_11] : 1 [TextBox_13] : 40 [YearQuit] : 2009

## 2023-12-18 NOTE — REASON FOR VISIT
[TextBox_44] : Patient states she has been feeling well.  She only SOB with exertion.  She is having Cataract surgery on 12/19/23 with Dr Cordova at [facility].

## 2023-12-19 ENCOUNTER — RESULT REVIEW (OUTPATIENT)
Age: 88
End: 2023-12-19

## 2023-12-19 ENCOUNTER — APPOINTMENT (OUTPATIENT)
Dept: OPHTHALMOLOGY | Facility: HOSPITAL | Age: 88
End: 2023-12-19

## 2023-12-20 ENCOUNTER — NON-APPOINTMENT (OUTPATIENT)
Age: 88
End: 2023-12-20

## 2023-12-20 ENCOUNTER — APPOINTMENT (OUTPATIENT)
Dept: OPHTHALMOLOGY | Facility: CLINIC | Age: 88
End: 2023-12-20

## 2023-12-27 ENCOUNTER — APPOINTMENT (OUTPATIENT)
Dept: OPHTHALMOLOGY | Facility: CLINIC | Age: 88
End: 2023-12-27

## 2024-01-08 ENCOUNTER — NON-APPOINTMENT (OUTPATIENT)
Age: 89
End: 2024-01-08

## 2024-01-29 ENCOUNTER — APPOINTMENT (OUTPATIENT)
Dept: OPHTHALMOLOGY | Facility: CLINIC | Age: 89
End: 2024-01-29

## 2024-02-29 ENCOUNTER — RX RENEWAL (OUTPATIENT)
Age: 89
End: 2024-02-29

## 2024-02-29 RX ORDER — TIOTROPIUM BROMIDE INHALATION SPRAY 1.56 UG/1
1.25 SPRAY, METERED RESPIRATORY (INHALATION)
Qty: 3 | Refills: 3 | Status: ACTIVE | COMMUNITY
Start: 2021-11-11 | End: 1900-01-01

## 2024-05-08 ENCOUNTER — NON-APPOINTMENT (OUTPATIENT)
Age: 89
End: 2024-05-08

## 2024-06-03 ENCOUNTER — APPOINTMENT (OUTPATIENT)
Dept: PULMONOLOGY | Facility: CLINIC | Age: 89
End: 2024-06-03
Payer: MEDICARE

## 2024-06-03 VITALS
DIASTOLIC BLOOD PRESSURE: 62 MMHG | SYSTOLIC BLOOD PRESSURE: 120 MMHG | OXYGEN SATURATION: 98 % | HEIGHT: 63 IN | BODY MASS INDEX: 19.84 KG/M2 | HEART RATE: 63 BPM | WEIGHT: 112 LBS | TEMPERATURE: 97.9 F

## 2024-06-03 DIAGNOSIS — J44.9 CHRONIC OBSTRUCTIVE PULMONARY DISEASE, UNSPECIFIED: ICD-10-CM

## 2024-06-03 PROCEDURE — ZZZZZ: CPT

## 2024-06-03 PROCEDURE — 99214 OFFICE O/P EST MOD 30 MIN: CPT | Mod: 25

## 2024-06-03 PROCEDURE — 94727 GAS DIL/WSHOT DETER LNG VOL: CPT

## 2024-06-03 PROCEDURE — 94010 BREATHING CAPACITY TEST: CPT

## 2024-06-03 PROCEDURE — 94729 DIFFUSING CAPACITY: CPT

## 2024-06-03 RX ORDER — CLOPIDOGREL 75 MG/1
TABLET, FILM COATED ORAL
Refills: 0 | Status: ACTIVE | COMMUNITY

## 2024-06-03 NOTE — PHYSICAL EXAM
[No Acute Distress] : no acute distress [Normal Oropharynx] : normal oropharynx [Normal Appearance] : normal appearance [No Neck Mass] : no neck mass [Normal Rate/Rhythm] : normal rate/rhythm [Normal S1, S2] : normal s1, s2 [No Murmurs] : no murmurs [No Resp Distress] : no resp distress [Clear to Auscultation Bilaterally] : clear to auscultation bilaterally [No Abnormalities] : no abnormalities [Benign] : benign [Normal Gait] : normal gait [No Clubbing] : no clubbing [No Cyanosis] : no cyanosis [No Edema] : no edema [FROM] : FROM [Normal Color/ Pigmentation] : normal color/ pigmentation [No Focal Deficits] : no focal deficits [Oriented x3] : oriented x3 [Normal Affect] : normal affect [TextBox_68] : Very poor aeration all areas

## 2024-06-03 NOTE — REASON FOR VISIT
[Follow-Up] : a follow-up visit [COPD] : COPD [TextEntry] : 6 months. Patient has no pulmonary complaints other than her fluid buildup. Patient states she takes two BP medications but cannot remember the name of one, she will call us with the name.

## 2024-06-03 NOTE — DISCUSSION/SUMMARY
[FreeTextEntry1] : Ms. Lazaro has COPD.  At this time her respiratory status is stable and continue all therapy.  She complains of increasing fluid retention.  I do not see any significant edema nor here any on her lung exam She had increased her Lasix to 20 mg twice daily for the last 3 days.  I asked to continue it for the next 3 days and to call her cardiologist for follow-up evaluation. The patient understands and agrees with plan of care. Today's office visit encompassed 32 minutes. I conducted an extensive history, physical exam and reviewed diagnosis and treatment options including diagnostic tests,radiology studies including cat scans and the use of prescription medication.

## 2024-06-03 NOTE — HISTORY OF PRESENT ILLNESS
[Former] : former [Never] : never [TextBox_4] : 89 female hx copd  today feels good  recent in hosp for chf  and no thoracentesis but treated iv diuresis on diuretic at home  no cough no phlegm no chest pain  no wheeze  [TextBox_11] : 1 [TextBox_13] : 40 [YearQuit] : 2009

## 2024-06-03 NOTE — PROCEDURE
[FreeTextEntry1] : Pulmonary function test 6/3/2024 mild restrictive disease.  Severe obstruction small airways.  Severe emphysema interstitial disease.

## 2024-07-02 ENCOUNTER — RX RENEWAL (OUTPATIENT)
Age: 89
End: 2024-07-02

## 2024-07-02 RX ORDER — BUDESONIDE AND FORMOTEROL FUMARATE DIHYDRATE 80; 4.5 UG/1; UG/1
80-4.5 AEROSOL RESPIRATORY (INHALATION)
Qty: 30.6 | Refills: 2 | Status: ACTIVE | COMMUNITY
Start: 2024-07-02 | End: 1900-01-01

## 2024-07-12 ENCOUNTER — RX ONLY (RX ONLY)
Age: 89
End: 2024-07-12

## 2024-07-12 ENCOUNTER — OFFICE (OUTPATIENT)
Facility: LOCATION | Age: 89
Setting detail: OPHTHALMOLOGY
End: 2024-07-12
Payer: MEDICARE

## 2024-07-12 DIAGNOSIS — H25.13: ICD-10-CM

## 2024-07-12 DIAGNOSIS — H11.823: ICD-10-CM

## 2024-07-12 DIAGNOSIS — H02.403: ICD-10-CM

## 2024-07-12 DIAGNOSIS — H35.033: ICD-10-CM

## 2024-07-12 DIAGNOSIS — H43.393: ICD-10-CM

## 2024-07-12 PROBLEM — H02.834 DERMATOCHALSIS; RIGHT UPPER LID, RIGHT LOWER LID, LEFT UPPER LID, LEFT LOWER LID: Status: ACTIVE | Noted: 2024-07-12

## 2024-07-12 PROBLEM — H02.832 DERMATOCHALSIS; RIGHT UPPER LID, RIGHT LOWER LID, LEFT UPPER LID, LEFT LOWER LID: Status: ACTIVE | Noted: 2024-07-12

## 2024-07-12 PROBLEM — H02.831 DERMATOCHALSIS; RIGHT UPPER LID, RIGHT LOWER LID, LEFT UPPER LID, LEFT LOWER LID: Status: ACTIVE | Noted: 2024-07-12

## 2024-07-12 PROBLEM — H10.433 CONJUNCTIVITIS CHRONIC FOLLICULAR; BOTH EYES: Status: ACTIVE | Noted: 2024-07-12

## 2024-07-12 PROBLEM — H02.835 DERMATOCHALSIS; RIGHT UPPER LID, RIGHT LOWER LID, LEFT UPPER LID, LEFT LOWER LID: Status: ACTIVE | Noted: 2024-07-12

## 2024-07-12 PROBLEM — H11.153 PINGUECULA; BOTH EYES: Status: ACTIVE | Noted: 2024-07-12

## 2024-07-12 PROCEDURE — 92250 FUNDUS PHOTOGRAPHY W/I&R: CPT | Performed by: OPHTHALMOLOGY

## 2024-07-12 PROCEDURE — 92004 COMPRE OPH EXAM NEW PT 1/>: CPT | Performed by: OPHTHALMOLOGY

## 2024-07-12 ASSESSMENT — CONFRONTATIONAL VISUAL FIELD TEST (CVF)
OS_FINDINGS: FULL
OD_FINDINGS: FULL

## 2024-07-12 ASSESSMENT — LID POSITION - PTOSIS
OD_PTOSIS: T
OS_PTOSIS: 1+

## 2024-07-22 ENCOUNTER — NON-APPOINTMENT (OUTPATIENT)
Age: 89
End: 2024-07-22

## 2024-08-05 ENCOUNTER — APPOINTMENT (OUTPATIENT)
Dept: PULMONOLOGY | Facility: CLINIC | Age: 89
End: 2024-08-05

## 2024-08-06 ENCOUNTER — OFFICE (OUTPATIENT)
Facility: LOCATION | Age: 89
Setting detail: OPHTHALMOLOGY
End: 2024-08-06
Payer: MEDICARE

## 2024-08-06 DIAGNOSIS — Z01.818: ICD-10-CM

## 2024-08-06 DIAGNOSIS — H25.13: ICD-10-CM

## 2024-08-06 DIAGNOSIS — H52.12: ICD-10-CM

## 2024-08-06 DIAGNOSIS — H25.12: ICD-10-CM

## 2024-08-06 PROCEDURE — 92015 DETERMINE REFRACTIVE STATE: CPT | Performed by: OPHTHALMOLOGY

## 2024-08-06 PROCEDURE — 92025 CPTRIZED CORNEAL TOPOGRAPHY: CPT | Mod: GA | Performed by: OPHTHALMOLOGY

## 2024-08-06 PROCEDURE — 92286 ANT SGM IMG I&R SPECLR MIC: CPT | Mod: GA | Performed by: OPHTHALMOLOGY

## 2024-08-06 PROCEDURE — 92134 CPTRZ OPH DX IMG PST SGM RTA: CPT | Mod: GA | Performed by: OPHTHALMOLOGY

## 2024-08-06 PROCEDURE — 99214 OFFICE O/P EST MOD 30 MIN: CPT | Performed by: OPHTHALMOLOGY

## 2024-08-06 PROCEDURE — 92136 OPHTHALMIC BIOMETRY: CPT | Mod: 26,LT | Performed by: OPHTHALMOLOGY

## 2024-08-06 PROCEDURE — 92136 OPHTHALMIC BIOMETRY: CPT | Mod: TC | Performed by: OPHTHALMOLOGY

## 2024-08-06 ASSESSMENT — LID POSITION - PTOSIS
OS_PTOSIS: 1+
OD_PTOSIS: T

## 2024-08-22 ENCOUNTER — ASC (OUTPATIENT)
Dept: URBAN - METROPOLITAN AREA SURGERY 12 | Facility: SURGERY | Age: 89
Setting detail: OPHTHALMOLOGY
End: 2024-08-22
Payer: MEDICARE

## 2024-08-22 DIAGNOSIS — H25.12: ICD-10-CM

## 2024-08-22 DIAGNOSIS — H52.212: ICD-10-CM

## 2024-08-22 PROCEDURE — FEMTO FEMTOSECOND LASER: Mod: GY | Performed by: OPHTHALMOLOGY

## 2024-08-22 PROCEDURE — 66984 XCAPSL CTRC RMVL W/O ECP: CPT | Mod: LT | Performed by: OPHTHALMOLOGY

## 2024-08-22 PROCEDURE — A9270 NON-COVERED ITEM OR SERVICE: HCPCS | Mod: GY | Performed by: OPHTHALMOLOGY

## 2024-08-23 ENCOUNTER — OFFICE (OUTPATIENT)
Facility: LOCATION | Age: 89
Setting detail: OPHTHALMOLOGY
End: 2024-08-23
Payer: MEDICARE

## 2024-08-23 ENCOUNTER — RX ONLY (RX ONLY)
Age: 89
End: 2024-08-23

## 2024-08-23 DIAGNOSIS — H25.11: ICD-10-CM

## 2024-08-23 PROCEDURE — 99213 OFFICE O/P EST LOW 20 MIN: CPT | Mod: 24 | Performed by: OPHTHALMOLOGY

## 2024-08-23 PROCEDURE — 92136 OPHTHALMIC BIOMETRY: CPT | Mod: 26,RT | Performed by: OPHTHALMOLOGY

## 2024-08-23 ASSESSMENT — LID POSITION - PTOSIS
OD_PTOSIS: T
OS_PTOSIS: 1+

## 2024-08-26 ENCOUNTER — OFFICE (OUTPATIENT)
Facility: LOCATION | Age: 89
Setting detail: OPHTHALMOLOGY
End: 2024-08-26
Payer: MEDICARE

## 2024-08-26 DIAGNOSIS — H25.11: ICD-10-CM

## 2024-08-26 DIAGNOSIS — Z96.1: ICD-10-CM

## 2024-08-26 PROCEDURE — 99024 POSTOP FOLLOW-UP VISIT: CPT | Performed by: OPTOMETRIST

## 2024-09-05 ENCOUNTER — ASC (OUTPATIENT)
Dept: URBAN - METROPOLITAN AREA SURGERY 12 | Facility: SURGERY | Age: 89
Setting detail: OPHTHALMOLOGY
End: 2024-09-05
Payer: MEDICARE

## 2024-09-05 DIAGNOSIS — H25.11: ICD-10-CM

## 2024-09-05 DIAGNOSIS — H52.211: ICD-10-CM

## 2024-09-05 PROCEDURE — FEMTO PRECISION LASER CATARACT SURGERY: Mod: GY | Performed by: OPHTHALMOLOGY

## 2024-09-05 PROCEDURE — 66984 XCAPSL CTRC RMVL W/O ECP: CPT | Mod: 79,RT | Performed by: OPHTHALMOLOGY

## 2024-09-05 PROCEDURE — A9270 NON-COVERED ITEM OR SERVICE: HCPCS | Mod: GY | Performed by: OPHTHALMOLOGY

## 2024-09-06 ENCOUNTER — OFFICE (OUTPATIENT)
Facility: LOCATION | Age: 89
Setting detail: OPHTHALMOLOGY
End: 2024-09-06
Payer: MEDICARE

## 2024-09-06 DIAGNOSIS — Z96.1: ICD-10-CM

## 2024-09-06 PROCEDURE — 99024 POSTOP FOLLOW-UP VISIT: CPT | Performed by: OPTOMETRIST

## 2024-09-11 ENCOUNTER — OFFICE (OUTPATIENT)
Facility: LOCATION | Age: 89
Setting detail: OPHTHALMOLOGY
End: 2024-09-11
Payer: MEDICARE

## 2024-09-11 DIAGNOSIS — Z96.1: ICD-10-CM

## 2024-09-11 PROCEDURE — 99024 POSTOP FOLLOW-UP VISIT: CPT | Performed by: OPTOMETRIST

## 2024-09-23 ENCOUNTER — OFFICE (OUTPATIENT)
Facility: LOCATION | Age: 89
Setting detail: OPHTHALMOLOGY
End: 2024-09-23
Payer: MEDICARE

## 2024-09-23 DIAGNOSIS — Z96.1: ICD-10-CM

## 2024-09-23 PROBLEM — H10.433 CONJUNCTIVITIS CHRONIC FOLLICULAR;  ,, BOTH EYES: Status: ACTIVE | Noted: 2024-09-23

## 2024-09-23 PROBLEM — H11.823 CONJUNCTIVOCHALASIS;  ,, BOTH EYES: Status: ACTIVE | Noted: 2024-09-23

## 2024-09-23 PROBLEM — H26.491 PCO; RIGHT EYE: Status: ACTIVE | Noted: 2024-09-23

## 2024-09-23 PROBLEM — H11.153 PINGUECULA;  ,, BOTH EYES: Status: ACTIVE | Noted: 2024-09-23

## 2024-09-23 PROCEDURE — 99024 POSTOP FOLLOW-UP VISIT: CPT | Performed by: OPHTHALMOLOGY

## 2024-09-23 ASSESSMENT — LID POSITION - PTOSIS
OD_PTOSIS: T
OS_PTOSIS: 1+

## 2024-10-22 ENCOUNTER — OFFICE (OUTPATIENT)
Facility: LOCATION | Age: 89
Setting detail: OPHTHALMOLOGY
End: 2024-10-22
Payer: MEDICARE

## 2024-10-22 DIAGNOSIS — Z96.1: ICD-10-CM

## 2024-10-22 PROBLEM — H11.151 PINGUECULA;  ,,  , RIGHT EYE: Status: ACTIVE | Noted: 2024-10-22

## 2024-10-22 PROBLEM — H11.821: Status: ACTIVE | Noted: 2024-10-22

## 2024-10-22 PROBLEM — H10.431 CONJUNCTIVITIS CHRONIC FOLLICULAR;  ,,  , RIGHT EYE: Status: ACTIVE | Noted: 2024-10-22

## 2024-10-22 PROCEDURE — 99024 POSTOP FOLLOW-UP VISIT: CPT | Performed by: OPHTHALMOLOGY

## 2024-10-22 ASSESSMENT — TONOMETRY
OS_IOP_MMHG: 14
OD_IOP_MMHG: 14

## 2024-10-22 ASSESSMENT — LID POSITION - PTOSIS
OD_PTOSIS: T
OS_PTOSIS: 1+

## 2024-10-23 ASSESSMENT — KERATOMETRY
OD_AXISANGLE_DEGREES: 39
OS_K2POWER_DIOPTERS: 44.50
OD_K2POWER_DIOPTERS: 44.50
OS_AXISANGLE_DEGREES: 170
OD_K1POWER_DIOPTERS: 44.25
OS_K1POWER_DIOPTERS: 44.00

## 2024-10-23 ASSESSMENT — REFRACTION_CURRENTRX
OS_OVR_VA: 20/
OS_SPHERE: +2.50
OS_CYLINDER: SPHERE
OD_SPHERE: +2.50
OD_OVR_VA: 20/
OD_CYLINDER: SPHERE

## 2024-10-23 ASSESSMENT — REFRACTION_MANIFEST
OS_CYLINDER: SPHERE
OD_SPHERE: PLANO
OD_VA1: 20/25-2
OD_CYLINDER: +1.00
OD_SPHERE: PLANO
OS_SPHERE: -0.50
OS_VA1: 20/20-1
OS_AXIS: 165
OS_ADD: +2.50
OS_CYLINDER: +1.00
OU_VA: 20/20-1
OD_ADD: +2.50
OS_SPHERE: PLANO
OS_VA1: 20/30-1
OD_VA1: 20/20-1
OD_AXIS: 165
OD_CYLINDER: SPHERE

## 2024-10-23 ASSESSMENT — REFRACTION_AUTOREFRACTION
OD_CYLINDER: +0.50
OD_AXIS: 17
OD_SPHERE: +0.25
OS_SPHERE: -0.50
OS_CYLINDER: +1.25
OS_AXIS: 167

## 2024-10-23 ASSESSMENT — VISUAL ACUITY
OD_BCVA: 20/25-2
OS_BCVA: 20/20

## 2025-01-06 ENCOUNTER — OFFICE (OUTPATIENT)
Facility: LOCATION | Age: OVER 89
Setting detail: OPHTHALMOLOGY
End: 2025-01-06
Payer: MEDICARE

## 2025-01-06 ENCOUNTER — RX ONLY (RX ONLY)
Age: OVER 89
End: 2025-01-06

## 2025-01-06 DIAGNOSIS — Z96.1: ICD-10-CM

## 2025-01-06 DIAGNOSIS — H26.491: ICD-10-CM

## 2025-01-06 DIAGNOSIS — H10.431: ICD-10-CM

## 2025-01-06 PROCEDURE — 99213 OFFICE O/P EST LOW 20 MIN: CPT | Performed by: OPHTHALMOLOGY

## 2025-01-06 ASSESSMENT — CONFRONTATIONAL VISUAL FIELD TEST (CVF)
OD_FINDINGS: FULL
OS_FINDINGS: FULL

## 2025-01-06 ASSESSMENT — TONOMETRY
OD_IOP_MMHG: 14
OS_IOP_MMHG: 14

## 2025-01-06 ASSESSMENT — LID POSITION - PTOSIS
OD_PTOSIS: T
OS_PTOSIS: 1+

## 2025-01-07 ASSESSMENT — REFRACTION_CURRENTRX
OD_OVR_VA: 20/
OS_OVR_VA: 20/
OD_SPHERE: +2.50
OS_SPHERE: +2.50
OD_CYLINDER: SPHERE
OS_CYLINDER: SPHERE

## 2025-01-07 ASSESSMENT — REFRACTION_MANIFEST
OD_VA1: 20/25-2
OD_SPHERE: PLANO
OD_AXIS: 165
OD_CYLINDER: +1.00
OS_CYLINDER: SPHERE
OS_SPHERE: -0.50
OS_SPHERE: PLANO
OS_VA1: 20/30-1
OS_ADD: +2.50
OD_CYLINDER: SPHERE
OS_CYLINDER: +1.00
OS_AXIS: 165
OD_ADD: +2.50
OD_VA1: 20/20-1
OS_VA1: 20/20-1
OD_SPHERE: PLANO
OU_VA: 20/20-1

## 2025-01-07 ASSESSMENT — KERATOMETRY
OD_K2POWER_DIOPTERS: 44.50
OD_AXISANGLE_DEGREES: 39
OS_K2POWER_DIOPTERS: 44.50
OS_AXISANGLE_DEGREES: 170
OS_K1POWER_DIOPTERS: 44.00
OD_K1POWER_DIOPTERS: 44.25

## 2025-01-07 ASSESSMENT — REFRACTION_AUTOREFRACTION
OD_CYLINDER: +0.50
OS_CYLINDER: +1.25
OS_AXIS: 167
OS_SPHERE: -0.50
OD_AXIS: 17
OD_SPHERE: +0.25

## 2025-01-07 ASSESSMENT — VISUAL ACUITY
OD_BCVA: 20/25
OS_BCVA: 20/25

## 2025-04-01 ENCOUNTER — OFFICE (OUTPATIENT)
Facility: LOCATION | Age: OVER 89
Setting detail: OPHTHALMOLOGY
End: 2025-04-01
Payer: MEDICARE

## 2025-04-01 DIAGNOSIS — H26.493: ICD-10-CM

## 2025-04-01 DIAGNOSIS — Z96.1: ICD-10-CM

## 2025-04-01 DIAGNOSIS — H10.431: ICD-10-CM

## 2025-04-01 PROCEDURE — 99213 OFFICE O/P EST LOW 20 MIN: CPT | Performed by: OPHTHALMOLOGY

## 2025-04-01 ASSESSMENT — CONFRONTATIONAL VISUAL FIELD TEST (CVF)
OD_FINDINGS: FULL
OS_FINDINGS: FULL

## 2025-04-02 ASSESSMENT — REFRACTION_MANIFEST
OD_CYLINDER: SPHERE
OS_SPHERE: PLANO
OU_VA: 20/20-1
OD_SPHERE: PLANO
OD_CYLINDER: +1.00
OD_SPHERE: PLANO
OS_CYLINDER: SPHERE
OD_ADD: +2.50
OD_VA1: 20/25-2
OS_VA1: 20/20-1
OS_SPHERE: -0.50
OD_AXIS: 165
OS_CYLINDER: +1.00
OS_AXIS: 165
OS_VA1: 20/30-1
OS_ADD: +2.50
OD_VA1: 20/20-1

## 2025-04-02 ASSESSMENT — KERATOMETRY
OS_K2POWER_DIOPTERS: 44.50
OD_K1POWER_DIOPTERS: 44.25
OS_AXISANGLE_DEGREES: 170
OD_AXISANGLE_DEGREES: 39
OD_K2POWER_DIOPTERS: 44.50
OS_K1POWER_DIOPTERS: 44.00

## 2025-04-02 ASSESSMENT — REFRACTION_CURRENTRX
OS_SPHERE: +2.50
OD_CYLINDER: SPHERE
OS_OVR_VA: 20/
OD_SPHERE: +2.50
OD_OVR_VA: 20/
OS_CYLINDER: SPHERE

## 2025-04-02 ASSESSMENT — REFRACTION_AUTOREFRACTION
OS_AXIS: 167
OD_SPHERE: +0.25
OD_CYLINDER: +0.50
OS_SPHERE: -0.50
OD_AXIS: 17
OS_CYLINDER: +1.25

## 2025-04-02 ASSESSMENT — VISUAL ACUITY
OD_BCVA: 20/25-2
OS_BCVA: 20/25-2

## (undated) DEVICE — SSH-EBUS GFUE160AL5 1312469: Type: DURABLE MEDICAL EQUIPMENT

## (undated) DEVICE — VALVE ENDOSCOPE DEFENDO SINGLE USE

## (undated) DEVICE — FORCEP RADIAL JAW 4 W NDL 2.4MM 2.8MM 240CM ORANGE DISP